# Patient Record
Sex: MALE | Race: WHITE | URBAN - METROPOLITAN AREA
[De-identification: names, ages, dates, MRNs, and addresses within clinical notes are randomized per-mention and may not be internally consistent; named-entity substitution may affect disease eponyms.]

---

## 2017-08-31 ENCOUNTER — OFFICE VISIT (OUTPATIENT)
Dept: PSYCHIATRY | Facility: CLINIC | Age: 22
End: 2017-08-31
Attending: PSYCHIATRY & NEUROLOGY
Payer: COMMERCIAL

## 2017-08-31 VITALS
DIASTOLIC BLOOD PRESSURE: 89 MMHG | SYSTOLIC BLOOD PRESSURE: 161 MMHG | WEIGHT: 194.8 LBS | BODY MASS INDEX: 26.42 KG/M2 | HEART RATE: 83 BPM

## 2017-08-31 DIAGNOSIS — F10.20 UNCOMPLICATED ALCOHOL DEPENDENCE (H): ICD-10-CM

## 2017-08-31 DIAGNOSIS — F12.20 MODERATE TETRAHYDROCANNABINOL (THC) DEPENDENCE (H): ICD-10-CM

## 2017-08-31 DIAGNOSIS — F31.30 BIPOLAR I DISORDER, MOST RECENT EPISODE DEPRESSED (H): Primary | ICD-10-CM

## 2017-08-31 PROCEDURE — 99212 OFFICE O/P EST SF 10 MIN: CPT | Mod: ZF
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
## 2017-08-31 NOTE — NURSING NOTE
Chief Complaint   Patient presents with     Eval/Assessment     Bipolar I disorder     Reviewed allergies, smoking status, and pharmacy preference  Administered abuse screening questions   Obtained weight, blood pressure and heart rate

## 2017-08-31 NOTE — MR AVS SNAPSHOT
After Visit Summary   2017    Elvis Esteves    MRN: [id number]           Patient Information     Date Of Birth          1995        Visit Information        Provider Department      2017 3:00 PM Demond Franco MD [facility]        Today's Diagnoses     Bipolar I disorder, most recent episode depressed (H)    -  1    Uncomplicated alcohol dependence (H)        Moderate tetrahydrocannabinol (THC) dependence (H)          Care Instructions    Continue usual medications  Admission to [facility] in [place], after being cleared in ER  I suggest someone accompany you on your flight  I will see you on your return to [place]          Follow-ups after your visit        Follow-up notes from your care team     Return in about 4 weeks (around 2017).      Who to contact     Please call your clinic at [phone] to:    Ask questions about your health    Make or cancel appointments    Discuss your medicines    Learn about your test results    Speak to your doctor   If you have compliments or concerns about an experience at your clinic, or if you wish to file a complaint, please contact [facility] Patient Relations at [phone] or email us at [email or web address]         Additional Information About Your Visit        MyChart Information     [employer] is an electronic gateway that provides easy, online access to your medical records. With [employer], you can request a clinic appointment, read your test results, renew a prescription or communicate with your care team.     To sign up for [employer] visit the website at [email or web address]   You will be asked to enter the access code listed below, as well as some personal information. Please follow the directions to create your username and password.     Your access code is: [email or web address]  Expires: 2017 11:05 AM     Your access code will  in 90 days. If you need help or a  new code, please contact your [facility] or call [phone] for assistance.        Care EveryWhere ID     This is your Care EveryWhere ID. This could be used by other organizations to access your [place] medical records  [id number]        Your Vitals Were     Pulse BMI (Body Mass Index)                83 26.42 kg/m2           Blood Pressure from Last 3 Encounters:   08/31/17 161/89   06/13/15 129/70    Weight from Last 3 Encounters:   08/31/17 88.4 kg (194 lb 12.8 oz)              Today, you had the following     No orders found for display       Primary Care Provider Office Phone # Fax #    Reg Morales [phone]       [place] [address]        Equal Access to Services     ANCA ALLISON : Hadii sami saunders hadasho Soomaali, waaxda luqadaha, qaybta kaalmada adeegyada, waxdafne clark hayvianeyn erika sanches . So [facility] [phone].    ATENCIÓN: Si habla español, tiene a robbins disposición servicios gratuitos de asistencia lingüística. Llame al [phone].    We comply with applicable federal civil rights laws and [place] laws. We do not discriminate on the basis of race, color, national origin, age, disability sex, sexual orientation or gender identity.            Thank you!     Thank you for choosing [facility]  for your care. Our goal is always to provide you with excellent care. Hearing back from our patients is one way we can continue to improve our services. Please take a few minutes to complete the written survey that you may receive in the mail after your visit with us. Thank you!             Your Updated Medication List - Protect others around you: Learn how to safely use, store and throw away your medicines at www.disposemymeds.org.          This list is accurate as of: 8/31/17 11:59 PM.  Always use your most recent med list.                   Brand Name Dispense Instructions for use Diagnosis     LITHIUM CARBONATE PO           LUNESTA PO

## 2017-09-01 ASSESSMENT — PATIENT HEALTH QUESTIONNAIRE - PHQ9: SUM OF ALL RESPONSES TO PHQ QUESTIONS 1-9: 18

## 2017-09-01 NOTE — PATIENT INSTRUCTIONS
Continue usual medications  Admission to [facility] in [place], after being cleared in ER  I suggest someone accompany you on your flight  I will see you on your return to [place]

## 2018-01-08 ENCOUNTER — TELEPHONE (OUTPATIENT)
Dept: PSYCHIATRY | Facility: CLINIC | Age: 23
End: 2018-01-08

## 2018-01-08 NOTE — TELEPHONE ENCOUNTER
----- Message from Pamela Woodall sent at 1/8/2018  3:44 PM CST -----  Contact: [phone]  Salvador/Samantha    Caller:  patient  Medication:  Lithium er  Pharmacy and location:  [facility]  Have you contacted the pharmacy: no  How much of medication do you have left:  out  Pending appt: none- [place] medication. He is wondering if he could get a refill. He says that he is planning on establishing care with a psychiatrist [place].   Okay to leave VM:  yes

## 2018-01-09 NOTE — TELEPHONE ENCOUNTER
Appt history:  Last seen:  8/31/17  RTC:  4 weeks  Cancel:  none  No-show:  none  Next appt:  none     Incoming refill request from:  Pt via phone     Medication(s) requested:    Lithium ER     Last RF per med tab:   A provider from this clinic has never refilled this med.     Last RF per pharmacy:  Yesterday, 1/8/18 by Dr. Franklin.          No further action taken.